# Patient Record
Sex: MALE | Employment: OTHER | URBAN - METROPOLITAN AREA
[De-identification: names, ages, dates, MRNs, and addresses within clinical notes are randomized per-mention and may not be internally consistent; named-entity substitution may affect disease eponyms.]

---

## 2019-05-17 ENCOUNTER — OFFICE VISIT (OUTPATIENT)
Dept: NEUROLOGY | Age: 84
End: 2019-05-17
Payer: COMMERCIAL

## 2019-05-17 VITALS
SYSTOLIC BLOOD PRESSURE: 125 MMHG | WEIGHT: 138 LBS | BODY MASS INDEX: 21.66 KG/M2 | HEIGHT: 67 IN | HEART RATE: 52 BPM | DIASTOLIC BLOOD PRESSURE: 72 MMHG

## 2019-05-17 DIAGNOSIS — G20 IDIOPATHIC PARKINSON'S DISEASE (HCC): Primary | ICD-10-CM

## 2019-05-17 DIAGNOSIS — G30.1 LATE ONSET ALZHEIMER'S DISEASE WITHOUT BEHAVIORAL DISTURBANCE (HCC): ICD-10-CM

## 2019-05-17 DIAGNOSIS — R26.9 GAIT DIFFICULTY: ICD-10-CM

## 2019-05-17 DIAGNOSIS — G72.9 MYOPATHY: ICD-10-CM

## 2019-05-17 DIAGNOSIS — F02.80 LATE ONSET ALZHEIMER'S DISEASE WITHOUT BEHAVIORAL DISTURBANCE (HCC): ICD-10-CM

## 2019-05-17 PROCEDURE — 99205 OFFICE O/P NEW HI 60 MIN: CPT | Performed by: PSYCHIATRY & NEUROLOGY

## 2019-05-17 RX ORDER — MEMANTINE HYDROCHLORIDE 10 MG/1
10 TABLET ORAL 2 TIMES DAILY
COMMUNITY

## 2019-05-17 RX ORDER — METOPROLOL SUCCINATE 25 MG/1
25 TABLET, EXTENDED RELEASE ORAL DAILY
COMMUNITY

## 2019-05-17 RX ORDER — DUTASTERIDE 0.5 MG/1
0.5 CAPSULE, LIQUID FILLED ORAL DAILY
COMMUNITY

## 2019-05-17 RX ORDER — DONEPEZIL HYDROCHLORIDE 10 MG/1
10 TABLET, FILM COATED ORAL NIGHTLY
COMMUNITY

## 2019-05-17 RX ORDER — TAMSULOSIN HYDROCHLORIDE 0.4 MG/1
0.4 CAPSULE ORAL DAILY
COMMUNITY

## 2019-05-17 SDOH — HEALTH STABILITY: MENTAL HEALTH: HOW OFTEN DO YOU HAVE A DRINK CONTAINING ALCOHOL?: NEVER

## 2019-05-17 NOTE — PROGRESS NOTES
NEUROLOGY FOLLOW-UP  Patient Name:  Adela Lam  :   1935  Clinic Visit Date: 2019        REVIEW OF SYSTEMS  Constitutional Weight changes: absent, Fevers : absent, Fatigue: absent; Any recent hospitalizations:  absent.    HEENT Ears: normal, Eyes: no correction, Visual disturbance: absent   Reespiratory Shortness of breath: absent, Cough: absent   Cardivascular Chest pain: absent, Leg swelling :absent   GI Constipation: present, Diarrhea: absent, Swallowing change: absent    Urinary frequency: absent, Urinary urgency: absent, Urinary incontinence: absent   Musculoskeletal Neck pain: absent, Back pain: absent, Stiffness: absent, Muscle pain: absent, Joint pain: absent   Dermatological Hair loss: absent, Skin changes: absent   Neurological Memory loss: present, Confusion: absent, Seizures: absent; Trouble walking or imbalance: present, Dizziness: present, Weakness: present, Numbness absent, Tremor: absent, Spasm: absent, Speech difficulty: absent, Headache: absent, Light sensitivity: absent   Psychiatric Anxiety: absent, Depression  absent, Suicidal ideations absent   Hematologic Abnormal bleeding: absent, Anemia: absent, Clotting disorder: absent, Lymph gland changes: absent

## 2019-05-17 NOTE — PROGRESS NOTES
NEUROLOGY CONSULT  Patient Name:       Mony Skelton  :        1935  Clinic Visit Date:    2019    Dear Dr. Fran Jack MD     I had the opportunity to see your patient, Mr. Mony Skelton in neurology consultation today. As you know he  is a 81 y.o. right handed  male with  baseline dementia is brought to the clinic by his wife stating that he has been having increasing difficulties with walking. His gait difficulties are always fluctuating and some of the days he is able to walk without walker and at other times he has extreme difficulty walking even using the walker. She has noticed him walking as though his feet are glued to the floor but he doesn't remain the same. At times he has no trouble walking. He has difficulty getting out of car at times and he does have balance difficulties. He had a history of fall in 2018 and has had right hip surgery. He had another fall around that time and it was attributed to hypotension. He has pain in medial aspect of both thighs for the past few months. He has been on Tylenol and it does help. He denied back pain radiating down the extremities. Denied numbness associated with it. Denied cramps in calves. His wife stated that he has had cramps in the past.   According to his wife; patient has intermittent restless leg symptoms in his legs but again it is not occurring daily. She also stated that he has been having trouble with memory. He was having extreme difficulty with forgetfulness and recent conversations. He was depending on her for instrumental ADLs. He stopped driving for the past 2 years. He was evaluated at the [facility].  He has had MRI brain for years ago at [facility] and no evidence of stroke. He has cerebral cortical atrophy. He was started on Donepezil few years ago and the later on Namenda was added with \"some improvement\" as per his wife. Denied history of head injury.   Denied history of stroke. Previous Neurological Work-up:   MRI Brain at [facility] 8/31/2015  Mild to moderate chronic microvascular ischemic changes. Mild atrophy. Mild chronic inflammatory changes in the maxillary sinuses described above. No acute intracranial pathology. MRA HEAD 8/31/2015  No evidence of aneurysm or arteriovenous malformation. 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 Atherosclerotic disease is seen in the left vertebral artery with the stenosis. MRA NECK 8/31/2015   No evidence of dissection or abnormal focal stenosis to explain the patient's symptoms. Mild chronic disease is noted in the midsection of the right vertebral   artery. There is left vertebral artery patchy areas of focal narrowing in the distal circulation. Basilar artery demonstrates mild tortuosity with a vague zone of narrowing just proximal to its termination. No obvious stenosis in the anterior circulation. Review of systems done by staff reviewed and pertinent positives include memory loss, balance problems, dizziness, weakness, walking problems, restless legs, as stated above. Current Outpatient Medications on File Prior to Visit   Medication Sig Dispense Refill    donepezil (ARICEPT) 10 MG tablet Take 10 mg by mouth nightly      metoprolol succinate (TOPROL XL) 25 MG extended release tablet Take 25 mg by mouth daily      metFORMIN (GLUCOPHAGE) 1000 MG tablet Take 1,000 mg by mouth 2 times daily (with meals)      memantine (NAMENDA) 10 MG tablet Take 10 mg by mouth 2 times daily      tamsulosin (FLOMAX) 0.4 MG capsule Take 0.4 mg by mouth daily      dutasteride (AVODART) 0.5 MG capsule Take 0.5 mg by mouth daily       No current facility-administered medications on file prior to visit. Allergies: Thiago Angeles has No Known Allergies. History reviewed. No pertinent past medical history. History reviewed. No pertinent surgical history.   Social History: Thiago Angeles  reports that he has never Patient's wife verbalized understanding all of those instructions. Please note that portions of this note were completed with a voice recognition program.  Although every effort was made to ensure the accuracy of this  automated transcription, some errors in transcription may have occurred, occasionally words are mis-transcribed.

## 2019-05-17 NOTE — LETTER
[facility] Neurology Specialist  [facility] [place] [address]  Phone: [phone]  Fax: [phone]    Grayson James MD        May 17, 2019     Bob Gannon MD  [facility] [id number]    Patient: Delta Mckeon  MR Number: [id number]  YOB: 1935  Date of Visit: 2019    Dear Dr. Bob Gannon:    Thank you for the request for consultation for Patrick Ou to me for the evaluation of Christoph España  Below are the relevant portions of my assessment and plan of care. NEUROLOGY FOLLOW-UP  Patient Name:  Delta Mckeon  :   1935  Clinic Visit Date: 2019        REVIEW OF SYSTEMS  Constitutional Weight changes: absent, Fevers : absent, Fatigue: absent; Any recent hospitalizations:  absent.    HEENT Ears: normal, Eyes: no correction, Visual disturbance: absent   Reespiratory Shortness of breath: absent, Cough: absent   Cardivascular Chest pain: absent, Leg swelling :absent   GI Constipation: present, Diarrhea: absent, Swallowing change: absent    Urinary frequency: absent, Urinary urgency: absent, Urinary incontinence: absent   Musculoskeletal Neck pain: absent, Back pain: absent, Stiffness: absent, Muscle pain: absent, Joint pain: absent   Dermatological Hair loss: absent, Skin changes: absent   Neurological Memory loss: present, Confusion: absent, Seizures: absent; Trouble walking or imbalance: present, Dizziness: present, Weakness: present, Numbness absent, Tremor: absent, Spasm: absent, Speech difficulty: absent, Headache: absent, Light sensitivity: absent   Psychiatric Anxiety: absent, Depression  absent, Suicidal ideations absent   Hematologic Abnormal bleeding: absent, Anemia: absent, Clotting disorder: absent, Lymph gland changes: absent           NEUROLOGY CONSULT  Patient Name:       Delta Mckeon  :        1935  Clinic Visit Date:    2019    Dear Dr. Bob Gannon MD never used smokeless tobacco. He reports that he does not drink alcohol or use drugs. History reviewed. No pertinent family history. On exam: Blood pressure 125/72, pulse 52, height 5' 7\" (1.702 m), weight 138 lb (62.6 kg). GENERAL  Appears comfortable and in no distress   HEENT  NC/ AT   NECK  Supple and no bruits heard   MENTAL STATUS:  Alert, oriented to self and place but not to day of week, month, year; could recall none of the 3 test items at 5 minutes on formal memory testing; able to follow simple and complex commands; good naming and good repetition; difficulty with serial subtractions; difficulty with writing a sentence and copying the intersecting pentagons; slow speech but no dysarthria; significant cognitive impairment but no hallucination or delusion   CRANIAL NERVES: II     -      PERRLA, fundi reveal intact venous pulsations;  Visual fields intact to confrontation  III,IV,VI -  EOMs full, no afferent defect, no                      JEREMIAH, no ptosis  V     -     Normal facial sensation  VII    -     Normal facial symmetry  VIII   -     Intact hearing  IX,X -     Symmetrical palate  XI    -     Symmetrical shoulder shrug  XII   -     Midline tongue, no atrophy    MOTOR FUNCTION:  significant for good strength of grade 5/5 in bilateral proximal and distal muscle groups of both upper and lower extremities with normal bulk, normal tone and no involuntary movements, no tremor   SENSORY FUNCTION:  Normal touch, normal pin, normal vibration, normal proprioception   CEREBELLAR FUNCTION:  Intact fine motor control over upper limbs.,  No resting tremor; significant bradykinesia but no cogwheeling    REFLEX FUNCTION:  Symmetric, no perverted reflex, no Babinski sign   STATION and GAIT  Able to stand with the support; difficulty walking; stooped posture and some shuffling; extreme difficulty with poor postural reflexes on turning; but able to walk using a walker parkinsonism features, neuropathy versus radiculopathy, etc.   Patient's wife verbalized understanding all of those instructions. Please note that portions of this note were completed with a voice recognition program.  Although every effort was made to ensure the accuracy of this  automated transcription, some errors in transcription may have occurred, occasionally words are mis-transcribed. If you have questions, please do not hesitate to call me. I look forward to following Ulises Roman along with you.     Sincerely,        Kip Piña MD

## 2019-05-29 DIAGNOSIS — G72.9 MYOPATHY: ICD-10-CM

## 2020-10-30 ENCOUNTER — APPOINTMENT (OUTPATIENT)
Dept: GENERAL RADIOLOGY | Age: 85
DRG: 536 | End: 2020-10-30
Payer: COMMERCIAL

## 2020-10-30 ENCOUNTER — HOSPITAL ENCOUNTER (INPATIENT)
Age: 85
LOS: 1 days | Discharge: ANOTHER HOSPITAL | DRG: 536 | End: 2020-10-31
Attending: EMERGENCY MEDICINE | Admitting: INTERNAL MEDICINE
Payer: COMMERCIAL

## 2020-10-30 ENCOUNTER — APPOINTMENT (OUTPATIENT)
Dept: CT IMAGING | Age: 85
DRG: 536 | End: 2020-10-30
Payer: COMMERCIAL

## 2020-10-30 VITALS
HEIGHT: 71 IN | TEMPERATURE: 98.5 F | RESPIRATION RATE: 14 BRPM | HEART RATE: 88 BPM | WEIGHT: 160 LBS | BODY MASS INDEX: 22.4 KG/M2 | SYSTOLIC BLOOD PRESSURE: 136 MMHG | OXYGEN SATURATION: 97 % | DIASTOLIC BLOOD PRESSURE: 70 MMHG

## 2020-10-30 PROBLEM — S72.002A CLOSED LEFT HIP FRACTURE, INITIAL ENCOUNTER (HCC): Status: ACTIVE | Noted: 2020-10-30

## 2020-10-30 LAB
ABSOLUTE EOS #: 0.1 K/UL (ref 0–0.4)
ABSOLUTE IMMATURE GRANULOCYTE: ABNORMAL K/UL (ref 0–0.3)
ABSOLUTE LYMPH #: 0.6 K/UL (ref 1–4.8)
ABSOLUTE MONO #: 1 K/UL (ref 0.1–1.2)
ALBUMIN SERPL-MCNC: 4.1 G/DL (ref 3.5–5.2)
ALBUMIN/GLOBULIN RATIO: 1.5 (ref 1–2.5)
ALP BLD-CCNC: 73 U/L (ref 40–129)
ALT SERPL-CCNC: 12 U/L (ref 5–41)
ANION GAP SERPL CALCULATED.3IONS-SCNC: 13 MMOL/L (ref 9–17)
AST SERPL-CCNC: 14 U/L
BASOPHILS # BLD: 0 % (ref 0–2)
BASOPHILS ABSOLUTE: 0 K/UL (ref 0–0.2)
BILIRUB SERPL-MCNC: 0.65 MG/DL (ref 0.3–1.2)
BILIRUBIN DIRECT: 0.18 MG/DL
BILIRUBIN, INDIRECT: 0.47 MG/DL (ref 0–1)
BUN BLDV-MCNC: 14 MG/DL (ref 8–23)
BUN/CREAT BLD: NORMAL (ref 9–20)
CALCIUM SERPL-MCNC: 9.4 MG/DL (ref 8.6–10.4)
CHLORIDE BLD-SCNC: 98 MMOL/L (ref 98–107)
CO2: 24 MMOL/L (ref 20–31)
CREAT SERPL-MCNC: 0.96 MG/DL (ref 0.7–1.2)
DIFFERENTIAL TYPE: ABNORMAL
EOSINOPHILS RELATIVE PERCENT: 1 % (ref 1–4)
GFR AFRICAN AMERICAN: >60 ML/MIN
GFR NON-AFRICAN AMERICAN: >60 ML/MIN
GFR SERPL CREATININE-BSD FRML MDRD: NORMAL ML/MIN/{1.73_M2}
GFR SERPL CREATININE-BSD FRML MDRD: NORMAL ML/MIN/{1.73_M2}
GLOBULIN: NORMAL G/DL (ref 1.5–3.8)
GLUCOSE BLD-MCNC: 91 MG/DL (ref 70–99)
HCT VFR BLD CALC: 43.5 % (ref 41–53)
HEMOGLOBIN: 14.5 G/DL (ref 13.5–17.5)
IMMATURE GRANULOCYTES: ABNORMAL %
INR BLD: 1
LYMPHOCYTES # BLD: 5 % (ref 24–44)
MCH RBC QN AUTO: 30.6 PG (ref 26–34)
MCHC RBC AUTO-ENTMCNC: 33.4 G/DL (ref 31–37)
MCV RBC AUTO: 91.5 FL (ref 80–100)
MONOCYTES # BLD: 9 % (ref 2–11)
NRBC AUTOMATED: ABNORMAL PER 100 WBC
PARTIAL THROMBOPLASTIN TIME: 22.9 SEC (ref 21.3–31.3)
PDW BLD-RTO: 13.2 % (ref 12.5–15.4)
PLATELET # BLD: 197 K/UL (ref 140–450)
PLATELET ESTIMATE: ABNORMAL
PMV BLD AUTO: 8.9 FL (ref 6–12)
POTASSIUM SERPL-SCNC: 4.2 MMOL/L (ref 3.7–5.3)
PROTHROMBIN TIME: 10.8 SEC (ref 9.4–12.6)
RBC # BLD: 4.75 M/UL (ref 4.5–5.9)
RBC # BLD: ABNORMAL 10*6/UL
SARS-COV-2, RAPID: NOT DETECTED
SARS-COV-2: NORMAL
SARS-COV-2: NORMAL
SEG NEUTROPHILS: 85 % (ref 36–66)
SEGMENTED NEUTROPHILS ABSOLUTE COUNT: 10 K/UL (ref 1.8–7.7)
SODIUM BLD-SCNC: 135 MMOL/L (ref 135–144)
SOURCE: NORMAL
TOTAL PROTEIN: 6.8 G/DL (ref 6.4–8.3)
WBC # BLD: 11.7 K/UL (ref 3.5–11)
WBC # BLD: ABNORMAL 10*3/UL

## 2020-10-30 PROCEDURE — U0002 COVID-19 LAB TEST NON-CDC: HCPCS

## 2020-10-30 PROCEDURE — 36415 COLL VENOUS BLD VENIPUNCTURE: CPT

## 2020-10-30 PROCEDURE — 73552 X-RAY EXAM OF FEMUR 2/>: CPT

## 2020-10-30 PROCEDURE — 80076 HEPATIC FUNCTION PANEL: CPT

## 2020-10-30 PROCEDURE — 85610 PROTHROMBIN TIME: CPT

## 2020-10-30 PROCEDURE — 80048 BASIC METABOLIC PNL TOTAL CA: CPT

## 2020-10-30 PROCEDURE — 99285 EMERGENCY DEPT VISIT HI MDM: CPT

## 2020-10-30 PROCEDURE — 71045 X-RAY EXAM CHEST 1 VIEW: CPT

## 2020-10-30 PROCEDURE — 72125 CT NECK SPINE W/O DYE: CPT

## 2020-10-30 PROCEDURE — 70450 CT HEAD/BRAIN W/O DYE: CPT

## 2020-10-30 PROCEDURE — G0378 HOSPITAL OBSERVATION PER HR: HCPCS

## 2020-10-30 PROCEDURE — 85025 COMPLETE CBC W/AUTO DIFF WBC: CPT

## 2020-10-30 PROCEDURE — 72170 X-RAY EXAM OF PELVIS: CPT

## 2020-10-30 PROCEDURE — 73030 X-RAY EXAM OF SHOULDER: CPT

## 2020-10-30 PROCEDURE — 85730 THROMBOPLASTIN TIME PARTIAL: CPT

## 2020-10-30 RX ORDER — ONDANSETRON 2 MG/ML
4 INJECTION INTRAMUSCULAR; INTRAVENOUS EVERY 6 HOURS PRN
Status: CANCELLED | OUTPATIENT
Start: 2020-10-30

## 2020-10-30 RX ORDER — POTASSIUM CHLORIDE 7.45 MG/ML
10 INJECTION INTRAVENOUS PRN
Status: CANCELLED | OUTPATIENT
Start: 2020-10-30

## 2020-10-30 RX ORDER — DONEPEZIL HYDROCHLORIDE 10 MG/1
10 TABLET, FILM COATED ORAL NIGHTLY
Status: CANCELLED | OUTPATIENT
Start: 2020-10-30

## 2020-10-30 RX ORDER — POTASSIUM CHLORIDE 20 MEQ/1
40 TABLET, EXTENDED RELEASE ORAL PRN
Status: CANCELLED | OUTPATIENT
Start: 2020-10-30

## 2020-10-30 RX ORDER — SODIUM CHLORIDE 0.9 % (FLUSH) 0.9 %
10 SYRINGE (ML) INJECTION EVERY 12 HOURS SCHEDULED
Status: CANCELLED | OUTPATIENT
Start: 2020-10-30

## 2020-10-30 RX ORDER — FINASTERIDE 5 MG/1
5 TABLET, FILM COATED ORAL DAILY
Status: CANCELLED | OUTPATIENT
Start: 2020-10-30

## 2020-10-30 RX ORDER — SODIUM CHLORIDE 0.9 % (FLUSH) 0.9 %
10 SYRINGE (ML) INJECTION PRN
Status: CANCELLED | OUTPATIENT
Start: 2020-10-30

## 2020-10-30 RX ORDER — ACETAMINOPHEN 325 MG/1
650 TABLET ORAL EVERY 6 HOURS PRN
Status: CANCELLED | OUTPATIENT
Start: 2020-10-30

## 2020-10-30 RX ORDER — POLYETHYLENE GLYCOL 3350 17 G/17G
17 POWDER, FOR SOLUTION ORAL DAILY PRN
Status: CANCELLED | OUTPATIENT
Start: 2020-10-30

## 2020-10-30 RX ORDER — METOPROLOL SUCCINATE 25 MG/1
25 TABLET, EXTENDED RELEASE ORAL DAILY
Status: CANCELLED | OUTPATIENT
Start: 2020-10-30

## 2020-10-30 RX ORDER — MEMANTINE HYDROCHLORIDE 5 MG/1
10 TABLET ORAL 2 TIMES DAILY
Status: CANCELLED | OUTPATIENT
Start: 2020-10-30

## 2020-10-30 RX ORDER — NICOTINE POLACRILEX 4 MG
15 LOZENGE BUCCAL PRN
Status: CANCELLED | OUTPATIENT
Start: 2020-10-30

## 2020-10-30 RX ORDER — SODIUM CHLORIDE 9 MG/ML
INJECTION, SOLUTION INTRAVENOUS CONTINUOUS
Status: CANCELLED | OUTPATIENT
Start: 2020-10-30

## 2020-10-30 RX ORDER — TAMSULOSIN HYDROCHLORIDE 0.4 MG/1
0.4 CAPSULE ORAL DAILY
Status: CANCELLED | OUTPATIENT
Start: 2020-10-30

## 2020-10-30 RX ORDER — PROMETHAZINE HYDROCHLORIDE 25 MG/1
12.5 TABLET ORAL EVERY 6 HOURS PRN
Status: CANCELLED | OUTPATIENT
Start: 2020-10-30

## 2020-10-30 RX ORDER — DEXTROSE MONOHYDRATE 50 MG/ML
100 INJECTION, SOLUTION INTRAVENOUS PRN
Status: CANCELLED | OUTPATIENT
Start: 2020-10-30

## 2020-10-30 RX ORDER — ACETAMINOPHEN 650 MG/1
650 SUPPOSITORY RECTAL EVERY 6 HOURS PRN
Status: CANCELLED | OUTPATIENT
Start: 2020-10-30

## 2020-10-30 RX ORDER — NICOTINE 21 MG/24HR
1 PATCH, TRANSDERMAL 24 HOURS TRANSDERMAL DAILY PRN
Status: CANCELLED | OUTPATIENT
Start: 2020-10-30

## 2020-10-30 RX ORDER — MAGNESIUM SULFATE 1 G/100ML
1 INJECTION INTRAVENOUS PRN
Status: CANCELLED | OUTPATIENT
Start: 2020-10-30

## 2020-10-30 RX ORDER — DEXTROSE MONOHYDRATE 25 G/50ML
12.5 INJECTION, SOLUTION INTRAVENOUS PRN
Status: CANCELLED | OUTPATIENT
Start: 2020-10-30

## 2020-10-30 ASSESSMENT — PAIN SCALES - GENERAL: PAINLEVEL_OUTOF10: 8

## 2020-10-30 NOTE — ED PROVIDER NOTES
[facility] ED  EMERGENCY DEPARTMENT ENCOUNTER      Pt Name: Jalen Waters  MRN: [id number]  [place] 1935  Date of evaluation: 10/30/2020  Provider: Timmy Pinzon MD    CHIEF COMPLAINT     Chief Complaint   Patient presents with    Fall     pt fell at home yesterday. wife is on her way to provide accurate history         HISTORY OF PRESENT ILLNESS   (Location/Symptom, Timing/Onset, Context/Setting,Quality, Duration, Modifying Factors, Severity)  Note limiting factors. Jalen Waters is a 81 y.o. male who presents to the emergency department by EMS for evaluation of possible injuries due to fall yesterday. Patient has history of Parkinson's disease and gait problems. He is nonverbal.  At the time of my evaluation his family members had not reached the ED yet. Bruising has been noted above the left shoulder. It was not known if he had any injury to the head or neck. The history is provided by medical records and the EMS personnel. Nursing Notes werereviewed. REVIEW OF SYSTEMS    (2-9 systems for level 4, 10 or more for level 5)     Review of Systems   Unable to perform ROS: Mental status change   Neurological: Positive for speech difficulty and weakness. Dementia. Except as noted above the remainder of the review of systems was reviewed and negative. PAST MEDICAL HISTORY     Past Medical History:   Diagnosis Date    Dementia ([address]     Diabetes mellitus ([facility] [address]     Hypertension          SURGICALHISTORY     History reviewed. No pertinent surgical history.       CURRENT MEDICATIONS       Discharge Medication List as of 10/31/2020 12:43 AM      CONTINUE these medications which have NOT CHANGED    Details   donepezil (ARICEPT) 10 MG tablet Take 10 mg by mouth nightlyHistorical Med      metoprolol succinate (TOPROL XL) 25 MG extended release tablet Take 25 mg by mouth dailyHistorical Med      metFORMIN (GLUCOPHAGE) 1000 MG tablet Take 1,000 mg by mouth 2 times daily level 5)     ED Triage Vitals [10/30/20 1655]   BP Temp Temp Source Pulse Resp SpO2 Height Weight   121/83 98.5 °F (36.9 °C) Temporal 82 18 97 % 5' 11\" (1.803 m) 160 lb (72.6 kg)       Physical Exam  Vitals signs reviewed. Constitutional:       General: He is not in acute distress. HENT:      Head: Normocephalic and atraumatic. Right Ear: External ear normal.      Left Ear: External ear normal.      Nose: Nose normal.   Eyes:      Extraocular Movements: Extraocular movements intact. Neck:      Comments: Patient does not have any appreciable tenderness on palpation of the neck. He does look around and is able to move his neck. Cardiovascular:      Rate and Rhythm: Normal rate and regular rhythm. Pulmonary:      Effort: Pulmonary effort is normal. No respiratory distress. Breath sounds: Normal breath sounds. No rhonchi or rales. Abdominal:      Palpations: Abdomen is soft. Tenderness: There is no abdominal tenderness. Musculoskeletal:      Comments: Bruising is noted over the superior aspect of the left shoulder extending over the superior border of the left trapezius. Shoulder movement is accompanied with pain and is very limited. Patient appears to be tender in the area because if I try to palpate it he pushes my hand away. Patient does not have any abnormal shortening or angulation of either leg. His wife had stated that he has not been able to walk since the fall and seems to have pain in his left leg. I am localizing this in the left hip and any attempt at moving the left leg is accompanied with pain. Skin:     General: Skin is warm and dry. Coloration: Skin is pale. Skin is not jaundiced. Neurological:      General: No focal deficit present. Mental Status: He is alert.       Comments: Nonverbal.         DIAGNOSTIC RESULTS     EKG: All EKG's are interpreted by the Emergency Department Physician who either signs orCo-signs this chart in the absence of a cardiologist.    RADIOLOGY:   Non-plain film images such as CT, Ultrasound and MRI are read by the radiologist. Plain radiographic images are visualized and preliminarily interpreted by the emergency physician with the below findings:    Interpretation per the Radiologist below, ifavailable at the time of this note:    CT Cervical Spine WO Contrast   Final Result   Multilevel degenerative changes with no acute abnormality of the cervical   spine. CT Head WO Contrast   Final Result   No acute intracranial abnormality. Generalized atrophy and chronic small vessel ischemic white matter disease. XR SHOULDER LEFT (MIN 2 VIEWS)   Final Result   Left shoulder: Comminuted fracture distal left clavicle with mild offset. No   dislocation of the humeral head. AC relationship is preserved. Pelvis: Cervical fracture of the left hip. No dislocation. Right hip   arthroplasty in satisfactory alignment. Left femur: Cervical fracture left hip. No dislocation. Femoral artery   calcification. XR FEMUR LEFT (MIN 2 VIEWS)   Final Result   Left shoulder: Comminuted fracture distal left clavicle with mild offset. No   dislocation of the humeral head. AC relationship is preserved. Pelvis: Cervical fracture of the left hip. No dislocation. Right hip   arthroplasty in satisfactory alignment. Left femur: Cervical fracture left hip. No dislocation. Femoral artery   calcification. XR PELVIS (1-2 VIEWS)   Final Result   Left shoulder: Comminuted fracture distal left clavicle with mild offset. No   dislocation of the humeral head. AC relationship is preserved. Pelvis: Cervical fracture of the left hip. No dislocation. Right hip   arthroplasty in satisfactory alignment. Left femur: Cervical fracture left hip. No dislocation. Femoral artery   calcification. XR CHEST PORTABLE   Final Result   No acute cardiopulmonary abnormality.   Mildly displaced bilateral rib   fractures are age indeterminate (right 6th rib, right 8th rib and the left   7th rib). ED BEDSIDE ULTRASOUND:   Performed by ED Physician - none    LABS:  Labs Reviewed   CBC WITH AUTO DIFFERENTIAL - Abnormal; Notable for the following components:       Result Value    WBC 11.7 (*)     Seg Neutrophils 85 (*)     Lymphocytes 5 (*)     Segs Absolute 10.00 (*)     Absolute Lymph # 0.60 (*)     All other components within normal limits   PROTIME-INR   APTT   HEPATIC FUNCTION PANEL   BASIC METABOLIC PANEL   [id number]       All other labs were within normal range ornot returned as of this dictation. EMERGENCY DEPARTMENT COURSE and DIFFERENTIAL DIAGNOSIS/MDM:   Vitals:    Vitals:    10/30/20 1655 10/30/20 2200 10/30/20 2243 10/30/20 2333   BP: 121/83 (!) 171/90 (!) 142/71 136/70   Pulse: 82 86 78 88   Resp: 18 15  14   Temp: 98.5 °F (36.9 °C)      TempSrc: Temporal      SpO2: 97% 97%  97%   Weight: 72.6 kg (160 lb)      Height: 5' 11\" (1.803 m)          ED Course as of Oct 31 0739   Fri Oct 30, 2020   1900 Left clavicle and left hip subcapital fractures are identified. The plan is to admit the patient to this facility and he has been accepted by Cortez Hall CNP, on-call for hospitalist group and I have a call out to the orthopedist on call.    [SH]      ED Course User Index  [SH] Meaghan Greene MD       MDM    CONSULTS:  None    PROCEDURES:  Unlessotherwise noted below, none     Procedures    FINAL IMPRESSION      1. Closed fracture of left hip, initial encounter ([facility] [address]    2. Fracture of unspecified part of left clavicle, initial encounter for closed fracture    3. Fall, initial encounter          DISPOSITION/PLAN   DISPOSITION Decision To Transfer 10/30/2020 08:49:03 PM      PATIENT REFERRED TO:  No follow-up provider specified.     DISCHARGE MEDICATIONS:         Problem List:  Patient Active Problem List   Diagnosis Code    Gait difficulty R26.9    Closed left hip fracture, initial encounter [facility]) S72.002A           Summation      Patient Course: Admitted. ED Medicationsadministered this visit:  Medications - No data to display    New Prescriptions from this visit:    Discharge Medication List as of 10/31/2020 12:43 AM          Follow-up:  No follow-up provider specified. Final Impression:   1. Closed fracture of left hip, initial encounter ([facility] [address]    2. Fracture of unspecified part of left clavicle, initial encounter for closed fracture    3.  Fall, initial encounter               (Please note that portions of this note were completed with a voice recognitionprogram.  Efforts were made to edit the dictations but occasionally words are mis-transcribed.)    Emily Mcclellan MD (electronically signed)  Attending Emergency Physician            Emily Mcclellan MD  10/30/20 Cate Baez MD  10/31/20 [id number] 152Tanmay Michaud MD  10/31/20 1730

## 2020-10-30 NOTE — ED TRIAGE NOTES
pts wife states that pt fell yesterday and hurt his shoulder and that he hasnt been able to walk since the fall

## 2020-10-31 PROCEDURE — G0378 HOSPITAL OBSERVATION PER HR: HCPCS

## 2020-10-31 NOTE — ED PROVIDER NOTES
I spoke with wife that she did have some questions after signout. Patient was going to be staying here during signout. But wife relates she would prefer Dr. Aris Rothman and she does not care where they have to be transferred to get him. So I was able to speak to this physician who does accept the patient to [facility].  He will have to go through the emergency department as he is a trauma patient. Calling access for this purpose now. Dr. Tolu Au has accepted the patient to [facility] ED.     Lui Richardson M.D., Daysi Moreno MD  10/30/20 5639       Clista Skiff, MD  10/30/20 7183

## 2020-10-31 NOTE — ED NOTES
Spoke with Ksenia Juárez from [facility]. Ksenia Juárez RN reports going to call Dr. Marquise Kc , and to call this RN with updated information.       Trevor Vee RN  10/30/20 2115       Trevor Vee RN  10/30/20 2127

## 2020-10-31 NOTE — ED NOTES
Dr. Maia Glass contacted and transferred to [facility].       Sanju Quintanilla, RN  10/30/20 2035

## 2020-10-31 NOTE — ED NOTES
Report called to [facility] RN @ [facility] @ [phone].       Sanju Quintanilla RN  10/30/20 2050 [place] LAYA Fitch  10/31/20 000

## 2020-10-31 NOTE — ED NOTES
[facility] [employer] [facility] ETA [id number]., [facility] ETA [id number]. Emy Moseley called at [phone] and [facility] transport initiated.       Heather Cordero RN  10/30/20 2143       Heather Cordero RN  10/30/20 2143

## 2020-10-31 NOTE — ED NOTES
Shani Gamez from [address] [facility] placed on phone with Dr. Mando Pierce.       Corky Dale RN  10/30/20 9047

## 2020-10-31 NOTE — ED NOTES
Hunter Wong from [address] access to arrange transport to [facility] ER, Nurse to Nurse [phone], [facility] Devon Cordon in ER at [facility] aware, Dr. Magen Mckeon updated per [address] access.       Doni Sands RN  10/30/20 2131       Doni Sands RN  10/30/20 0434

## 2020-10-31 NOTE — ED NOTES
Access RN Merari Turpin [facility].       Melinda Dimas RN  10/30/20 2651

## 2021-08-06 NOTE — ED NOTES
Cheo Baron, house supervisor updated on POC.      Frida Freire RN  10/30/20 2127 Detail Level: Detailed